# Patient Record
Sex: FEMALE | URBAN - METROPOLITAN AREA
[De-identification: names, ages, dates, MRNs, and addresses within clinical notes are randomized per-mention and may not be internally consistent; named-entity substitution may affect disease eponyms.]

---

## 2019-05-26 ENCOUNTER — EMERGENCY (EMERGENCY)
Facility: HOSPITAL | Age: 29
LOS: 1 days | Discharge: HOME | End: 2019-05-26
Attending: EMERGENCY MEDICINE | Admitting: EMERGENCY MEDICINE
Payer: MEDICAID

## 2019-05-26 VITALS
OXYGEN SATURATION: 99 % | TEMPERATURE: 98 F | HEART RATE: 68 BPM | RESPIRATION RATE: 16 BRPM | DIASTOLIC BLOOD PRESSURE: 71 MMHG | SYSTOLIC BLOOD PRESSURE: 116 MMHG

## 2019-05-26 VITALS
DIASTOLIC BLOOD PRESSURE: 48 MMHG | OXYGEN SATURATION: 100 % | RESPIRATION RATE: 20 BRPM | HEART RATE: 63 BPM | SYSTOLIC BLOOD PRESSURE: 85 MMHG | TEMPERATURE: 96 F

## 2019-05-26 PROCEDURE — 99283 EMERGENCY DEPT VISIT LOW MDM: CPT | Mod: 25

## 2019-05-26 NOTE — ED PROVIDER NOTE - CARE PLAN
Principal Discharge DX:	Evaluation by medical service required Principal Discharge DX:	Evaluation by medical service required  Secondary Diagnosis:	Alcohol intoxication

## 2019-05-26 NOTE — ED PROVIDER NOTE - NSFOLLOWUPINSTRUCTIONS_ED_ALL_ED_FT
Rest, drink plenty of fluids.  Advance activity as tolerated.  Continue all previously prescribed medications as directed.  Follow up with your primary care physician in 48-72 hours- bring copies of your results.  Return to the ER for worsening or persistent symptoms, and/or ANY NEW OR CONCERNING SYMPTOMS. If you have issues obtaining follow up, please call: [phone] to obtain a doctor or specialist who takes your insurance in your area.

## 2019-05-26 NOTE — ED ADULT NURSE NOTE - OBJECTIVE STATEMENT
Pt brought to rm 25 bibems - pulled from back of [facility] for intoxication/unable to arouse patient - per Triage RN pt BP hypotensive and pupils sluggish/reactive, pt drowsy.  Pt in EMS stretcher, ED MD Wagner to assess, sat patient forward and pt awake, appropriate/responsive and increasingly alert, +AOB, intox but calm/cooperative and oriented x3, following commands and ambulatory w/ steady gait, tolerating PO. Friend arrived to ED at this time - called by EMS (called back a missed call on patient's phone en route to ED), reports was w/ patient tonight, endorses drinking and no drugs.  Pt endorses drinking 4 glasses ETOH tonight, no drug use, no trauma/falls. Denies any significant PMHx, no active complaints at present.  Reports is a very sound sleeper and states is usually difficult to rouse when asleep.  Pt lives w/ parents, friend who is clinically sober at bedside would be able to drive her home.  Per MD Wagner/Gift @ bedside no RN intervention ordered at this time, VSS - as noted, BP improved, resps even/unlabored on RA.  Will await MD dispo/DC to care of friend. Pt brought to rm 25 bibems - pulled from back of [facility] for intoxication/unable to arouse patient - per Triage RN pt BP hypotensive and pupils sluggish/reactive, pt drowsy.  Pt in EMS stretcher, ED MD Wagner to assess, sat patient forward and pt awake, appropriate/responsive and increasingly alert, +AOB, but calm/cooperative and oriented x3, following commands and ambulatory w/ steady gait, tolerating PO. Friend arrived to ED at this time - called by EMS (called back a missed call on patient's phone en route to ED), reports was w/ patient tonight, endorses drinking and no drugs.  Pt endorses drinking 4 glasses ETOH tonight, no drug use, no trauma/falls. Denies any significant PMHx, no active complaints at present.  Reports is a very sound sleeper and states is usually difficult to rouse when asleep.  Pt lives w/ parents, friend who is clinically sober at bedside would be able to drive her home.  Per MD Wagner/Kaylee @ bedside no RN intervention ordered at this time, VSS - as noted, BP improved, resps even/unlabored on RA.  Will await MD dispo/DC to care of friend.

## 2019-05-26 NOTE — ED PROVIDER NOTE - CLINICAL SUMMARY MEDICAL DECISION MAKING FREE TEXT BOX
27 yo F brought in for assessment after falling asleep in cab. A&Ox3, ambulating without difficulty, clinically sober, patient's friend arrived at bedside. DC 27 yo F brought in for assessment after falling asleep in cab. A&Ox3, ambulating without difficulty, clinically sober, VSS, patient's friend arrived at bedside. DC 29 yo F brought in for assessment after falling asleep in cab. A&Ox3, ambulating without difficulty, clinically sober, VSS, patient's friend arrived at bedside will bring patient home. DC

## 2019-05-26 NOTE — ED PROVIDER NOTE - OBJECTIVE STATEMENT
29 yo F no pmhx BIBA for intoxication/unresponsiveness. Patient called [employer], and when she arrived home was not responding to verbal stimuli/tapping. EMS called, not responding to sternal rub and reported sluggish pupils. Upon arrival to ED room patient responding to verbal stimuli. Denies falls/trauma. No pain. Endorses drinking 4 alcoholic beverage tonight, to drug use.

## 2019-05-26 NOTE — ED ADULT TRIAGE NOTE - CHIEF COMPLAINT QUOTE
Pt BETSY HUI with Police  Officer. per EMS,  called EMS, due to Pt. unable to wake up to got into the house/address she gave. AOB noted. Hypotensive as noted on the flowsheet, response to painful stimuli, pinpoint pupils, sluggish to response to light. FS at scene 100. CN made aware. Unable to get good story. directed to room 25.

## 2019-05-26 NOTE — ED ADULT NURSE NOTE - NSIMPLEMENTINTERV_GEN_ALL_ED
Implemented All Fall Risk Interventions:  [place] to call system. Call bell, personal items and telephone within reach. Instruct patient to call for assistance. Room bathroom lighting operational. Non-slip footwear when patient is off stretcher. Physically safe environment: no spills, clutter or unnecessary equipment. Stretcher in lowest position, wheels locked, appropriate side rails in place. Provide visual cue, wrist band, yellow gown, etc. Monitor gait and stability. Monitor for mental status changes and reorient to person, place, and time. Review medications for side effects contributing to fall risk. Reinforce activity limits and safety measures with patient and family.

## 2019-05-26 NOTE — ED PROVIDER NOTE - PHYSICAL EXAMINATION
VITALS: reviewed  GEN: NAD, A & O x 4  HEAD/EYES: NCAT, PERRL, EOMI, anicteric sclerae, no conjunctival pallor  ENT: mucus membranes moist, oropharynx WNL, trachea midline,  RESP: lungs CTA with equal breath sounds bilaterally, chest wall nontender and atraumatic  CV: heart with reg rhythm S1, S2, distal pulses intact and symmetric bilaterally  ABDOMEN: normoactive bowel sounds, soft, nondistended, nontender, no palpable masses  : no CVAT  MSK: extremities atraumatic and nontender, no edema, no asymmetry. the back is without midline or lateral tenderness, there is no spinal deformity or stepoff and the back is ranged painlessly. the neck has no midline tenderness, deformity, or stepoff, and is ranged painlessly.  SKIN: warm, dry, no rash, no bruising, no cyanosis. color appropriate for ethnicity  NEURO: alert, mentating appropriately, no facial asymmetry. gross sensation, motor, coordination are intact  PSYCH: Affect appropriate

## 2019-05-26 NOTE — ED PROVIDER NOTE - ATTENDING CONTRIBUTION TO CARE
29 y/o F with no PMH BIB EMS for unresponsiveness.  Pt was out at a bar tonight, reportedly had several drinks.  She called a lyft to take her home, but upon arrival to her house pt was asleep in the backseat and the  was unable to wake her.  PD and EMS called and pt was unable to be aroused, transported to ED.  In triage pt noted to be hypotensive, normal blood glucose, and brought back to room.  Upon my evaluation pt woke up with sternal rub, now awake and alert.  Friend called by EMS and arrives at bedside.  Pt reports having 4 drinks tonight, denies concomitant drug use.  Friend corroborates, but adds that she had "some shots too."  No fall or trauma.  She denies any pain, sob, n/v.  She reports feeling fine and requesting to go home.  Well appearing, lying in stretcher, awake and alert, nontoxic.  VSS, normotensive.  NCAT EOMI PERRL.  Neck supple, no midline spinal tenderness.  Lungs cta bl.  Cards nl S1/S2, RRR, no MRG.  Abd soft ntnd.  Pelvis stable, all extremities intact, no gross deformities and normal ROM.  No focal neuro deficits, gait is steady.  Pt is tolerating POs, clinically sober, will dc in care of friend who will take her home.

## 2019-05-28 PROBLEM — Z00.00 ENCOUNTER FOR PREVENTIVE HEALTH EXAMINATION: Status: ACTIVE | Noted: 2019-05-28

## 2021-04-26 ENCOUNTER — TRANSCRIPTION ENCOUNTER (OUTPATIENT)
Age: 31
End: 2021-04-26

## 2021-08-21 NOTE — ED ADULT TRIAGE NOTE - NS ED TRIAGE AVPU SCALE
Painful - The patient does not respond to voice, but does respond to a painful stimulus, such as a squeeze to the hand or sternal rub. A noxious stimulous is needed to elicit a response.
Patient interviewed in a private space.

## 2021-12-14 ENCOUNTER — TRANSCRIPTION ENCOUNTER (OUTPATIENT)
Age: 31
End: 2021-12-14